# Patient Record
Sex: FEMALE | Race: WHITE | NOT HISPANIC OR LATINO | URBAN - METROPOLITAN AREA
[De-identification: names, ages, dates, MRNs, and addresses within clinical notes are randomized per-mention and may not be internally consistent; named-entity substitution may affect disease eponyms.]

---

## 2022-02-16 ENCOUNTER — NEW PATIENT (OUTPATIENT)
Dept: URBAN - METROPOLITAN AREA CLINIC 4 | Facility: CLINIC | Age: 77
End: 2022-02-16

## 2022-02-16 DIAGNOSIS — H43.813: ICD-10-CM

## 2022-02-16 DIAGNOSIS — H11.153: ICD-10-CM

## 2022-02-16 PROCEDURE — 92004 COMPRE OPH EXAM NEW PT 1/>: CPT

## 2022-02-16 PROCEDURE — 92015 DETERMINE REFRACTIVE STATE: CPT

## 2022-02-16 ASSESSMENT — KERATOMETRY
OD_AXISANGLE2_DEGREES: 172
OS_AXISANGLE2_DEGREES: 9
OD_K1POWER_DIOPTERS: 40.50
OS_K2POWER_DIOPTERS: 42.50
OD_K2POWER_DIOPTERS: 42.50
OS_K1POWER_DIOPTERS: 40.50
OS_AXISANGLE_DEGREES: 99
OD_AXISANGLE_DEGREES: 82

## 2022-02-16 ASSESSMENT — VISUAL ACUITY
OU_CC: 20/30-2
OS_CC: 20/50-1
OD_CC: 20/30-2
OS_GLARE: 20/200-1

## 2022-02-16 ASSESSMENT — TONOMETRY
OD_IOP_MMHG: 14
OS_IOP_MMHG: 13

## 2022-07-12 NOTE — PROCEDURE NOTE: CLINICAL
PROCEDURE NOTE: Punctal Plugs, Extended #1 Bilateral Lower Lids. Diagnosis: Dry Eye Syndrome. Prior to treatment, the risks/benefits/alternatives were discussed. The patient wished to proceed with procedure. Extended duration plugs were inserted. Size/location of plugs inserted: 0.3 BLL OU. Patient tolerated procedure well. There were no complications. Post procedure instructions given. LOT# [id number].

## 2022-07-12 NOTE — PATIENT DISCUSSION
HISTORY: Pt has been diagnosed w/ Polymyalgia rheumatica & hypothyroidism. See's rheumatologist Dr. Pauline Otto annually for check ups and stated everything has came back normal. Never been diagnosed w/ Sjogren's or fany Rica's. Does not take any current medications besides vitamin supplements.

## 2022-07-12 NOTE — PATIENT DISCUSSION
ASSESMENT PMM 7/12/22: VERY DRY OCULAR SURFACE. Need to treat the ocular surface. Will give prescribe Lotemax gtt QID for 1 week, BID for a week, Qday for a week OU, gave samples to use restasis QID OU ( will send in RX) and inserted  BLL PP OU w/ no complications. Recommended to use warm compresses, PFAT'S, and eyelid cleanser. REPEAT SO'S DUE TO OCULAR SURFACE and see pt back in clinic to check Dry eye before proceeding w/ Cataract surgery. Likely contributed by autoimmune disease.

## 2023-04-05 ENCOUNTER — ESTABLISHED PATIENT (OUTPATIENT)
Dept: URBAN - METROPOLITAN AREA CLINIC 4 | Facility: CLINIC | Age: 78
End: 2023-04-05

## 2023-04-05 DIAGNOSIS — H43.813: ICD-10-CM

## 2023-04-05 DIAGNOSIS — H01.112: ICD-10-CM

## 2023-04-05 DIAGNOSIS — H11.153: ICD-10-CM

## 2023-04-05 PROCEDURE — 92015 DETERMINE REFRACTIVE STATE: CPT

## 2023-04-05 PROCEDURE — 92014 COMPRE OPH EXAM EST PT 1/>: CPT

## 2023-04-05 ASSESSMENT — VISUAL ACUITY
OD_CC: 20/40-2
OS_CC: 20/40-2
OD_SC: 20/200
OS_SC: 20/80-2
OU_CC: 20/40-2
OU_SC: 20/80-2

## 2023-04-05 ASSESSMENT — KERATOMETRY
OD_K1POWER_DIOPTERS: 40.50
OD_K2POWER_DIOPTERS: 43.00
OD_AXISANGLE_DEGREES: 87
OS_AXISANGLE2_DEGREES: 3
OD_AXISANGLE2_DEGREES: 177
OS_AXISANGLE_DEGREES: 93
OS_K2POWER_DIOPTERS: 43.00
OS_K1POWER_DIOPTERS: 40.75

## 2023-04-05 ASSESSMENT — TONOMETRY
OD_IOP_MMHG: 16
OS_IOP_MMHG: 18

## 2024-11-13 ENCOUNTER — COMPREHENSIVE EXAM (OUTPATIENT)
Facility: LOCATION | Age: 79
End: 2024-11-13

## 2024-11-13 DIAGNOSIS — H01.112: ICD-10-CM

## 2024-11-13 DIAGNOSIS — H11.153: ICD-10-CM

## 2024-11-13 DIAGNOSIS — H43.813: ICD-10-CM

## 2024-11-13 PROCEDURE — 92014 COMPRE OPH EXAM EST PT 1/>: CPT

## 2024-11-13 PROCEDURE — 92015 DETERMINE REFRACTIVE STATE: CPT
